# Patient Record
Sex: MALE | Race: WHITE | NOT HISPANIC OR LATINO | Employment: FULL TIME | URBAN - METROPOLITAN AREA
[De-identification: names, ages, dates, MRNs, and addresses within clinical notes are randomized per-mention and may not be internally consistent; named-entity substitution may affect disease eponyms.]

---

## 2017-04-06 PROCEDURE — 99284 EMERGENCY DEPT VISIT MOD MDM: CPT

## 2017-04-06 ASSESSMENT — PAIN SCALES - GENERAL: PAINLEVEL_OUTOF10: 0

## 2017-04-07 ENCOUNTER — APPOINTMENT (OUTPATIENT)
Dept: RADIOLOGY | Facility: MEDICAL CENTER | Age: 52
End: 2017-04-07
Attending: EMERGENCY MEDICINE
Payer: COMMERCIAL

## 2017-04-07 ENCOUNTER — HOSPITAL ENCOUNTER (EMERGENCY)
Facility: MEDICAL CENTER | Age: 52
End: 2017-04-07
Attending: EMERGENCY MEDICINE
Payer: COMMERCIAL

## 2017-04-07 VITALS
DIASTOLIC BLOOD PRESSURE: 80 MMHG | SYSTOLIC BLOOD PRESSURE: 135 MMHG | WEIGHT: 189.6 LBS | TEMPERATURE: 97.5 F | OXYGEN SATURATION: 98 % | HEART RATE: 64 BPM | HEIGHT: 74 IN | BODY MASS INDEX: 24.33 KG/M2 | RESPIRATION RATE: 16 BRPM

## 2017-04-07 DIAGNOSIS — R05.9 COUGH: Primary | ICD-10-CM

## 2017-04-07 PROCEDURE — 71010 DX-CHEST-PORTABLE (1 VIEW): CPT

## 2017-04-07 RX ORDER — BENZONATATE 100 MG/1
100 CAPSULE ORAL 3 TIMES DAILY PRN
Qty: 20 CAP | Refills: 0 | Status: SHIPPED | OUTPATIENT
Start: 2017-04-07

## 2017-04-07 ASSESSMENT — LIFESTYLE VARIABLES: DO YOU DRINK ALCOHOL: NO

## 2017-04-07 ASSESSMENT — PAIN SCALES - GENERAL: PAINLEVEL_OUTOF10: 0

## 2017-04-07 NOTE — ED PROVIDER NOTES
"ED Provider Note    CHIEF COMPLAINT  Chief Complaint   Patient presents with   • Cough   • Other       HPI  Moises Gomez is a 51 y.o. male who presents to the emergency department with nonproductive cough for 2 weeks. No chest pain or shortness of breath. No wheezes or retractions. No fever or chills. No nasal congestion, rhinorrhea, sore throat or ear pain. Patient believes his symptoms started after he spray painted some cinder blocks although he was wearing to \"hospital grade masks\" during that time. No recent travel. No sick contacts.    REVIEW OF SYSTEMS  See HPI for further details. All other systems are negative.     PAST MEDICAL HISTORY    denies    SOCIAL HISTORY  Social History     Social History Main Topics   • Smoking status: Never Smoker    • Smokeless tobacco: Never Used   • Alcohol Use: No   • Drug Use: Not on file   • Sexual Activity: Not on file      Comment: single,        SURGICAL HISTORY   has past surgical history that includes hemorrhoidectomy.    CURRENT MEDICATIONS  Home Medications     Reviewed by Juany Ta R.N. (Registered Nurse) on 04/07/17 at 0110  Med List Status: Complete    Medication Last Dose Status          Patient Moreno Taking any Medications                        ALLERGIES  No Known Allergies    PHYSICAL EXAM  VITAL SIGNS: /75 mmHg  Pulse 65  Temp(Src) 36.4 °C (97.5 °F) (Temporal)  Resp 16  Ht 1.88 m (6' 2.02\")  Wt 86 kg (189 lb 9.5 oz)  BMI 24.33 kg/m2  SpO2 97%  Pulse ox interpretation: I interpret this pulse ox as normal.  Constitutional: Alert in no apparent distress.  HENT: Normocephalic, atraumatic. Bilateral external ears normal, Nose normal. Moist mucous membranes.    Eyes: Pupils are equal and reactive, Conjunctiva normal.   Neck: Normal range of motion, Supple. No JVD. No stridor or dysphonia.  Lymphatic: No lymphadenopathy noted.   Cardiovascular: Regular rate and rhythm, no murmurs. Distal pulses intact.  No peripheral edema.  Thorax & " Lungs: Normal breath sounds.  No wheezing/rales/ronchi. No increased work of breathing, clipped speech or retractions.   Skin: Warm, Dry, No erythema, No rash.   Musculoskeletal: Good range of motion in all major joints.   Neurologic: Alert , no gross focal deficit noted.  Psychiatric: Affect normal, Judgment normal, Mood normal.       DIAGNOSTIC STUDIES / PROCEDURES    RADIOLOGY  DX-CHEST-PORTABLE (1 VIEW)   Final Result         1.  No acute cardiopulmonary disease.        COURSE & MEDICAL DECISION MAKING  Nursing notes and vital signs were reviewed. (See chart for details)  The patients records were reviewed, history was obtained from the patient;    ED evaluation of cough is unrevealing. Physical exam is unremarkable. Vital signs are stable without fever or tachycardia. Patient is in no acute respiratory distress was not hypoxic. No clinical or radiographic evidence for pneumonia, or pneumothorax. Exposure to inhalant however patient was wearing a mask, symptomatology is less likely that of a pneumonitis.    Patient is stable for discharge at this time, anticipatory guidance provided, Tessalon as needed, close follow-up is encouraged, and strict ED return instructions have been detailed. Patient is agreeable to the disposition and plan.    Patient's blood pressure was elevated in the emergency department, and has been referred to primary care for close monitoring.    FINAL IMPRESSION  (R05) Cough  (primary encounter diagnosis)      Electronically signed by: Diana Peña, 4/7/2017 1:15 AM      This dictation was created using voice recognition software. The accuracy of the dictation is limited to the abilities of the software. I expect there may be some errors of grammar and possibly content. The nursing notes were reviewed and certain aspects of this information were incorporated into this note.

## 2017-04-07 NOTE — ED AVS SNAPSHOT
Home Care Instructions                                                                                                                Moises Gomez   MRN: [id number]    Department:  [facility], Emergency Dept   Date of Visit:  4/6/2017            [facility], Emergency Dept    [address]    [place] [address]    Phone:  [phone]      You were seen by     Diana Peña D.O.      Your Diagnosis Was     Cough     R05       Follow-up Information     1. Follow up with [facility] In 1 day.    Contact information    [place] [address] [phone]          2. Follow up with [facility] In 1 day.    Contact information    [address] [place] [id number] [phone]        3. Follow up with [facility] In 1 day.    Contact information    [address]  [place] [address] [phone]        Medication Information     Review all of your home medications and newly ordered medications with your primary doctor and/or pharmacist as soon as possible. Follow medication instructions as directed by your doctor and/or pharmacist.     Please keep your complete medication list with you and share with your physician. Update the information when medications are discontinued, doses are changed, or new medications (including over-the-counter products) are added; and carry medication information at all times in the event of emergency situations.               Medication List      START taking these medications        Instructions    Morning Afternoon Evening Bedtime    benzonatate 100 MG Caps   Commonly known as:  TESSALON        Take 1 Cap by mouth 3 times a day as needed for Cough.   Dose:  100 mg                             Where to Get Your Medications      You can get these medications from any pharmacy     Bring a paper prescription for each of these medications    - benzonatate 100 MG Caps            Procedures and tests performed during your visit     DX-CHEST-PORTABLE (1 VIEW)        Discharge Instructions       Follow-up with primary care early next week for reevaluation, to establish care, medication management and close blood pressure monitoring.    Tessalon pearls 3 times daily as needed for cough.  A cool mist humidifier may be beneficial as well.    Return to the emergency department for difficulty breathing, wheezing, retractions, fever or other new concerns.    Cough, Adult   A cough is a reflex that helps clear your throat and airways. It can help heal the body or may be a reaction to an irritated airway. A cough may only last 2 or 3 weeks (acute) or may last more than 8 weeks (chronic).   CAUSES  Acute cough:  · Viral or bacterial infections.  Chronic cough:  · Infections.  · Allergies.  · Asthma.  · Post-nasal drip.  · Smoking.  · Heartburn or acid reflux.  · Some medicines.  · Chronic lung problems (COPD).  · Cancer.  SYMPTOMS   · Cough.  · Fever.  · Chest pain.  · Increased breathing rate.  · High-pitched whistling sound when breathing (wheezing).  · Colored mucus that you cough up (sputum).  TREATMENT   · A bacterial cough may be treated with antibiotic medicine.  · A viral cough must run its course and will not respond to antibiotics.  · Your caregiver may recommend other treatments if you have a chronic cough.  HOME CARE INSTRUCTIONS   · Only take over-the-counter or prescription medicines for pain, discomfort, or fever as directed by your caregiver. Use cough suppressants only as directed by your caregiver.  · Use a cold steam vaporizer or humidifier in your bedroom or home to help loosen secretions.  · Sleep in a semi-upright position if your cough is worse at night.  · Rest as needed.  · Stop smoking if you smoke.  SEEK IMMEDIATE MEDICAL CARE IF:   · You have pus in your sputum.  · Your cough starts to worsen.  · You cannot control your cough with suppressants and are losing sleep.  · You begin coughing up blood.  · You have difficulty  breathing.  · You develop pain which is getting worse or is uncontrolled with medicine.  · You have a fever.  MAKE SURE YOU:   · Understand these instructions.  · Will watch your condition.  · Will get help right away if you are not doing well or get worse.     This information is not intended to replace advice given to you by your health care provider. Make sure you discuss any questions you have with your health care provider.     Document Released: 06/15/2012 Document Revised: 03/11/2013 Document Reviewed: 02/24/2016  [employer] Patient Education ©2016 [employer] Inc.    Cool Mist Vaporizers  Vaporizers may help relieve the symptoms of a cough and cold. They add moisture to the air, which helps mucus to become thinner and less sticky. This makes it easier to breathe and cough up secretions. Cool mist vaporizers do not cause serious burns like hot mist vaporizers, which may also be called steamers or humidifiers. Vaporizers have not been proven to help with colds. You should not use a vaporizer if you are allergic to mold.  HOME CARE INSTRUCTIONS  · Follow the package instructions for the vaporizer.  · Do not use anything other than distilled water in the vaporizer.  · Do not run the vaporizer all of the time. This can cause mold or bacteria to grow in the vaporizer.  · Clean the vaporizer after each time it is used.  · Clean and dry the vaporizer well before storing it.  · Stop using the vaporizer if worsening respiratory symptoms develop.     This information is not intended to replace advice given to you by your health care provider. Make sure you discuss any questions you have with your health care provider.     Document Released: 09/14/2005 Document Revised: 12/23/2014 Document Reviewed: 05/07/2014  [employer] Patient Education ©2016 Elsevier Inc.            Patient Information     Patient Information    Following emergency treatment: all patient requiring follow-up care must return either  to a private physician or a clinic if your condition worsens before you are able to obtain further medical attention, please return to the emergency room.     Billing Information    At [facility], we work to make the billing process streamlined for our patients.  Our Representatives are here to answer any questions you may have regarding your hospital bill.  If you have insurance coverage and have supplied your insurance information to us, we will submit a claim to your insurer on your behalf.  Should you have any questions regarding your bill, we can be reached online or by phone as follows:  Online: You are able pay your bills online or live chat with our representatives about any billing questions you may have. We are here to help Monday - Friday from 8:00am to 7:30pm and 9:00am - 12:00pm on Saturdays.  Please visit [email or web address]  for more information.   Phone:  [phone] or [phone]    Please note that your emergency physician, surgeon, pathologist, radiologist, anesthesiologist, and other specialists are not employed by [facility] and will therefore bill separately for their services.  Please contact them directly for any questions concerning their bills at the numbers below:     Emergency Physician Services:  [phone]  [facility]:  [phone]  [facility]:  [phone]  [facility]:  [phone]    1. Your final bill may vary from the amount quoted upon discharge if all procedures are not complete at that time, or if your doctor has additional procedures of which we are not aware. You will receive an additional bill if you return to the Emergency Department at [facility] for suture removal regardless of the facility of which the sutures were placed.     2. Please arrange for settlement of this account at the emergency registration.    3. All self-pay accounts are due in full at the time of treatment.   If you are unable to meet this obligation then payment is expected within 4-5 days.     4. If you have had radiology studies (CT, X-ray, Ultrasound, MRI), you have received a preliminary result during your emergency department visit. Please contact the radiology department [phone] to receive a copy of your final result. Please discuss the Final result with your primary physician or with the follow up physician provided.     Crisis Hotline:  [place] Crisis Hotline:  4-438-ECGLJIF or 1-675.776.2869  [place] Crisis Hotline:    1-476.998.2399 or 900-910-2978         ED Discharge Follow Up Questions    1. In order to provide you with very good care, we would like to follow up with a phone call in the next few days.  May we have your permission to contact you?     YES /  NO    2. What is the best phone number to call you? (       )_____-__________    3. What is the best time to call you?      Morning  /  Afternoon  /  Evening                   Patient Signature:  ____________________________________________________________    Date:  ____________________________________________________________

## 2017-04-07 NOTE — ED AVS SNAPSHOT
SwiftKey Access Code: [email or web address]  Expires: 5/7/2017  3:09 AM    Your email address is not on file at [employer].  Email Addresses are required for you to sign up for SwiftKey, please contact [phone] to verify your personal information and to provide your email address prior to attempting to register for SwiftKey.    Moises Gomez  [address]   [facility] [place] [address]    SwiftKey  A secure, online tool to manage your health information     [employer]’s SwiftKey® is a secure, online tool that connects you to your personalized health information from the privacy of your home -- day or night - making it very easy for you to manage your healthcare. Once the activation process is completed, you can even access your medical information using the SwiftKey xavier, which is available for free in the Apple Xavier store or Google Play store.     To learn more about SwiftKey, visit [email or web address]    There are two levels of access available (as shown below):   My Chart Features  [facility] Primary Care Doctor [facility]  Specialists [facility]  Urgent  Care Non-[facility] Primary Care Doctor   Email your healthcare team securely and privately 24/7 X X X    Manage appointments: schedule your next appointment; view details of past/upcoming appointments X      Request prescription refills. X      View recent personal medical records, including lab and immunizations X X X X   View health record, including health history, allergies, medications X X X X   Read reports about your outpatient visits, procedures, consult and ER notes X X X X   See your discharge summary, which is a recap of your hospital and/or ER visit that includes your diagnosis, lab results, and care plan X X  X     How to register for SwiftKey:  Once your e-mail address has been verified, follow the following steps to sign up for SwiftKey.     1. Go to  [email or web address]  2. Click on the Sign Up Now box, which takes you to the New Member Sign Up page. You will need to  provide the following information:  a. Enter your [employer] Access Code exactly as it appears at the top of this page. (You will not need to use this code after you’ve completed the sign-up process. If you do not sign up before the expiration date, you must request a new code.)   b. Enter your date of birth.   c. Enter your home email address.   d. Click Submit, and follow the next screen’s instructions.  3. Create a [employer] ID. This will be your [employer] login ID and cannot be changed, so think of one that is secure and easy to remember.  4. Create a [employer] password. You can change your password at any time.  5. Enter your Password Reset Question and Answer. This can be used at a later time if you forget your password.   6. Enter your e-mail address. This allows you to receive e-mail notifications when new information is available in [employer].  7. Click Sign Up. You can now view your health information.    For assistance activating your [employer] account, call [phone]

## 2017-04-07 NOTE — ED NOTES
"Pt ambulatory to triage c/o dry cough/ \"inflammation in my lungs\" x 2 weeks after spray painting cinder blocks. Denies SOB or chest pain. VSS. Educated on triage process, encouraged to inform staff of any changes.  "

## 2017-04-07 NOTE — ED NOTES
Pt given discharge and follow up instructions and prescriptions, all questions answered, pt verbalized understanding. Pt discharged with self. Copy of discharge provided to pt. Signed copy in chart.  Pt states that all personal belongings are in possession. Pt ambulatory off unit.

## 2017-04-07 NOTE — DISCHARGE INSTRUCTIONS
Follow-up with primary care early next week for reevaluation, to establish care, medication management and close blood pressure monitoring.    Tessalon pearls 3 times daily as needed for cough.  A cool mist humidifier may be beneficial as well.    Return to the emergency department for difficulty breathing, wheezing, retractions, fever or other new concerns.    Cough, Adult   A cough is a reflex that helps clear your throat and airways. It can help heal the body or may be a reaction to an irritated airway. A cough may only last 2 or 3 weeks (acute) or may last more than 8 weeks (chronic).   CAUSES  Acute cough:  · Viral or bacterial infections.  Chronic cough:  · Infections.  · Allergies.  · Asthma.  · Post-nasal drip.  · Smoking.  · Heartburn or acid reflux.  · Some medicines.  · Chronic lung problems (COPD).  · Cancer.  SYMPTOMS   · Cough.  · Fever.  · Chest pain.  · Increased breathing rate.  · High-pitched whistling sound when breathing (wheezing).  · Colored mucus that you cough up (sputum).  TREATMENT   · A bacterial cough may be treated with antibiotic medicine.  · A viral cough must run its course and will not respond to antibiotics.  · Your caregiver may recommend other treatments if you have a chronic cough.  HOME CARE INSTRUCTIONS   · Only take over-the-counter or prescription medicines for pain, discomfort, or fever as directed by your caregiver. Use cough suppressants only as directed by your caregiver.  · Use a cold steam vaporizer or humidifier in your bedroom or home to help loosen secretions.  · Sleep in a semi-upright position if your cough is worse at night.  · Rest as needed.  · Stop smoking if you smoke.  SEEK IMMEDIATE MEDICAL CARE IF:   · You have pus in your sputum.  · Your cough starts to worsen.  · You cannot control your cough with suppressants and are losing sleep.  · You begin coughing up blood.  · You have difficulty breathing.  · You develop pain which is getting worse or is  uncontrolled with medicine.  · You have a fever.  MAKE SURE YOU:   · Understand these instructions.  · Will watch your condition.  · Will get help right away if you are not doing well or get worse.     This information is not intended to replace advice given to you by your health care provider. Make sure you discuss any questions you have with your health care provider.     Document Released: 06/15/2012 Document Revised: 03/11/2013 Document Reviewed: 02/24/2016  [employer] Patient Education ©2016 Elsevier Inc.    Cool Mist Vaporizers  Vaporizers may help relieve the symptoms of a cough and cold. They add moisture to the air, which helps mucus to become thinner and less sticky. This makes it easier to breathe and cough up secretions. Cool mist vaporizers do not cause serious burns like hot mist vaporizers, which may also be called steamers or humidifiers. Vaporizers have not been proven to help with colds. You should not use a vaporizer if you are allergic to mold.  HOME CARE INSTRUCTIONS  · Follow the package instructions for the vaporizer.  · Do not use anything other than distilled water in the vaporizer.  · Do not run the vaporizer all of the time. This can cause mold or bacteria to grow in the vaporizer.  · Clean the vaporizer after each time it is used.  · Clean and dry the vaporizer well before storing it.  · Stop using the vaporizer if worsening respiratory symptoms develop.     This information is not intended to replace advice given to you by your health care provider. Make sure you discuss any questions you have with your health care provider.     Document Released: 09/14/2005 Document Revised: 12/23/2014 Document Reviewed: 05/07/2014  [employer] Patient Education ©2016 [employer]vier Inc.

## 2017-04-07 NOTE — ED AVS SNAPSHOT
4/7/2017          Moises [place]   [address]  [place] [address]    Dear Moises:    Psychiatric hospital wants to ensure your discharge home is safe and you or your loved ones have had all your questions answered regarding your care after you leave the hospital.    You may receive a telephone call within two days of your discharge.  This call is to make certain you understand your discharge instructions as well as ensure we provided you with the best care possible during your stay with us.     The call will only last approximately 3-5 minutes and will be done by a nurse.    Once again, we want to ensure your discharge home is safe and that you have a clear understanding of any next steps in your care.  If you have any questions or concerns, please do not hesitate to contact us, we are here for you.  Thank you for choosing [facility] for your healthcare needs.    Sincerely,    Jose Coppola    [facility]